# Patient Record
Sex: FEMALE | Race: WHITE | Employment: FULL TIME | URBAN - METROPOLITAN AREA
[De-identification: names, ages, dates, MRNs, and addresses within clinical notes are randomized per-mention and may not be internally consistent; named-entity substitution may affect disease eponyms.]

---

## 2017-06-02 ENCOUNTER — OFFICE VISIT (OUTPATIENT)
Dept: ENDOCRINOLOGY | Facility: CLINIC | Age: 57
End: 2017-06-02

## 2017-06-02 VITALS
DIASTOLIC BLOOD PRESSURE: 55 MMHG | BODY MASS INDEX: 31.47 KG/M2 | WEIGHT: 171 LBS | HEART RATE: 75 BPM | HEIGHT: 62 IN | TEMPERATURE: 98.9 F | OXYGEN SATURATION: 97 % | SYSTOLIC BLOOD PRESSURE: 119 MMHG

## 2017-06-02 DIAGNOSIS — E66.01 MORBID OBESITY DUE TO EXCESS CALORIES (H): Primary | ICD-10-CM

## 2017-06-02 RX ORDER — TOPIRAMATE 25 MG/1
TABLET, FILM COATED ORAL
Qty: 90 TABLET | Refills: 5 | Status: SHIPPED | OUTPATIENT
Start: 2017-06-02 | End: 2017-09-15

## 2017-06-02 ASSESSMENT — ENCOUNTER SYMPTOMS
RECTAL PAIN: 0
MYALGIAS: 1
ARTHRALGIAS: 0
DIARRHEA: 0
NERVOUS/ANXIOUS: 1
EYE REDNESS: 0
VOMITING: 0
LOSS OF CONSCIOUSNESS: 0
EYE WATERING: 0
BOWEL INCONTINENCE: 0
NAUSEA: 1
NECK PAIN: 1
ABDOMINAL PAIN: 1
NUMBNESS: 1
DIZZINESS: 0
HEARTBURN: 0
ALTERED TEMPERATURE REGULATION: 0
PANIC: 0
POOR WOUND HEALING: 0
FEVER: 0
NAIL CHANGES: 0
DOUBLE VISION: 0
POLYDIPSIA: 0
MUSCLE CRAMPS: 0
POLYPHAGIA: 1
DECREASED CONCENTRATION: 0
RECTAL BLEEDING: 0
FATIGUE: 1
WEAKNESS: 1
EYE IRRITATION: 1
MUSCLE WEAKNESS: 1
HALLUCINATIONS: 0
BACK PAIN: 0
WEIGHT GAIN: 1
WEIGHT LOSS: 0
BLOOD IN STOOL: 0
BLOATING: 1
DISTURBANCES IN COORDINATION: 0
SPEECH CHANGE: 0
MEMORY LOSS: 0
JOINT SWELLING: 0
INSOMNIA: 1
JAUNDICE: 0
PARALYSIS: 0
DEPRESSION: 1
CHILLS: 0
HEADACHES: 1
SEIZURES: 0
TREMORS: 1
TINGLING: 1
DECREASED APPETITE: 0
INCREASED ENERGY: 0
CONSTIPATION: 1
EYE PAIN: 1
NIGHT SWEATS: 0
SKIN CHANGES: 1
STIFFNESS: 0

## 2017-06-02 ASSESSMENT — PAIN SCALES - GENERAL: PAINLEVEL: NO PAIN (0)

## 2017-06-02 NOTE — PROGRESS NOTES
"    Return Medical Weight Management Note     Linda Mcdaniel  MRN:  [id number]  :  1960  JL:  2017    Dear Colleagues    I had the pleasure of seeing your patient Linda Mcdaniel.  She is a 56 year old female who I am continuing to see for treatment of obesity related to: Sleep Apnea (has CPAP and does not use), Knee Pain and Depression. s/p LRYGB with Dr Green in  with pre-weight 238 lbs and gibson 169 lbs.     CURRENT WEIGHT:   171 lbs 0 oz    Wt Readings from Last 4 Encounters:   17 77.6 kg (171 lb)   16 73.9 kg (163 lb)   16 79.2 kg (174 lb 8 oz)   02/13/15 82 kg (180 lb 11.2 oz)     Height:  5' 2.01\"  Body Mass Index:  Body mass index is 31.27 kg/(m^2).  Vitals:  B/P: 128/71, P: 70    Initial consult weight was 190 on 13.  Weight change since last seen on 2016 is down 3 pounds.   Total loss is 30 pounds.    INTERVAL HISTORY:  Feeling stress, craving and grazing on carbs. Feels that naltrexone no longer helps her resist sweets, which she identifies as major problem. Also taking bupropion through day as additional contributor on this effort.     Diet and Activity Changes Since Last Visit Reviewed With Patient 2017   I have made the following changes to my diet since my last visit: more protein focused decrease in carbohydrates   With regards to my diet, I am still struggling with: grazing throughout the day and craving carbs   For breakfast, I typically eat: greek yogurt and fruit   For lunch, I typically eat: nura salad and cottage cheese or meat   For supper, I typically eat: a meat or cheese   For snack(s), I typically eat: protein bar or protein drink sometimes milk   I have made the following changes to my activity/exercise since my last visit: started daily walks had to drop gym membership   With regards to my activity/exercise, I am still struggling with: motivation to stay active and no longer have a  to weight lift with "     MEDICATIONS:   Current Outpatient Prescriptions   Medication     naltrexone (DEPADE;REVIA) 50 MG tablet     MELATONIN PO     PILOCARPINE HCL PO     BIOTIN PO     CYANOCOBALAMIN SL     buPROPion (WELLBUTRIN) 75 MG tablet     amitriptyline (ELAVIL) 50 MG tablet     escitalopram (LEXAPRO) 20 MG tablet     levonorgestrel (MIRENA) 20 MCG/24HR IUD     Multiple Vitamin (MULTIVITAMINS PO)     Cholecalciferol (VITAMIN D-3) 5000 UNITS TABS     No current facility-administered medications for this visit.        Weight Loss Medication History Reviewed With Patient 6/2/2017   Which weight loss medications are you currently taking on a regular basis?  Naltrexone   Are you having any side effects from the weight loss medication that we have prescribed you? Yes   If you are having side effects please describe: feel like it is no longer effective to help reduce cravings     ASSESSMENT:   Will stop naltrexone and trial topiramate 25=>75mg HS. BP is good.    FOLLOW-UP:    12 weeks.  10/15 minutes spent on counseling and education     Sincerely,    Dipesh Zaman MD

## 2017-06-02 NOTE — MR AVS SNAPSHOT
After Visit Summary   6/2/2017    Linda Mcdaniel    MRN: [id number]           Patient Information     Date Of Birth          1960        Visit Information        Provider Department      6/2/2017 9:00 AM Dipesh Zaman MD [facility] Medical Weight Management        Today's Diagnoses     Morbid obesity due to excess calories (H)    -  1       Follow-ups after your visit        Follow-up notes from your care team     Return in about 3 months (around 9/2/2017).      Who to contact     Please call your clinic at [phone] to:    Ask questions about your health    Make or cancel appointments    Discuss your medicines    Learn about your test results    Speak to your doctor   If you have compliments or concerns about an experience at your clinic, or if you wish to file a complaint, please contact [facility] Patient Relations at [phone] or email us at [email or web address]         Additional Information About Your Visit        MyChart Information     [employer] gives you secure access to your electronic health record. If you see a primary care provider, you can also send messages to your care team and make appointments. If you have questions, please call your primary care clinic.  If you do not have a primary care provider, please call [phone] and they will assist you.      [facility] is an electronic gateway that provides easy, online access to your medical records. With [facility], you can request a clinic appointment, read your test results, renew a prescription or communicate with your care team.     To access your existing account, please contact your [facility] or call [phone] for assistance.        Care EveryWhere ID     This is your Care EveryWhere ID. This could be used by other organizations to access your [place] medical records  [id number]        Your Vitals Were     Pulse Temperature Height Pulse  "Oximetry BMI (Body Mass Index)       75 98.9  F (37.2  C) 1.575 m (5' 2.01\") 97% 31.27 kg/m2        Blood Pressure from Last 3 Encounters:   06/02/17 119/55   12/02/16 110/74   03/11/16 128/71    Weight from Last 3 Encounters:   06/02/17 77.6 kg (171 lb)   12/02/16 73.9 kg (163 lb)   03/11/16 79.2 kg (174 lb 8 oz)              Today, you had the following     No orders found for display         Today's Medication Changes          These changes are accurate as of: 6/2/17  9:24 AM.  If you have any questions, ask your nurse or doctor.               Start taking these medicines.        Dose/Directions    topiramate 25 MG tablet   Commonly known as:  TOPAMAX   Used for:  Morbid obesity due to excess calories (H)   Started by:  Dipesh Zaman MD        25 mg at bedtime for 1 week, 50 mg at bedtime for 1 week and 75 mg daily at bedtime thereafter   Quantity:  90 tablet   Refills:  5         Stop taking these medicines if you haven't already. Please contact your care team if you have questions.     CITRACAL CALCIUM+D PO   Stopped by:  Dipesh Zaman MD           naltrexone 50 MG tablet   Commonly known as:  DEPADE;REVIA   Stopped by:  Dipesh Zaman MD                Where to get your medicines      These medications were sent to [id number] [[place]][address] [address]     Phone:  [phone]     topiramate 25 MG tablet                Primary Care Provider Office Phone # Fax #    [facility] [phone]       [address] [place] [address]        Thank you!     Thank you for choosing [facility]NAG[facility]  for your care. Our goal is always to provide you with excellent care. Hearing back from our patients is one way we can continue to improve our services. Please take a few minutes to complete the written survey that you may receive in the mail after your " visit with us. Thank you!             Your Updated Medication List - Protect others around you: Learn how to safely use, store and throw away your medicines at www.disposemymeds.org.          This list is accurate as of: 6/2/17  9:24 AM.  Always use your most recent med list.                   Brand Name Dispense Instructions for use    amitriptyline 50 MG tablet    ELAVIL     Take 50 mg by mouth At Bedtime       BIOTIN PO      Take 10,000 mcg by mouth       buPROPion 75 MG tablet    WELLBUTRIN     Take 75 mg by mouth 5 times daily       CYANOCOBALAMIN SL          LEXAPRO 20 MG tablet   Generic drug:  escitalopram      Take 40 mg by mouth daily       MELATONIN PO          MIRENA (52 MG) 20 MCG/24HR IUD   Generic drug:  levonorgestrel      1 each by Intrauterine route once       MULTIVITAMINS PO      Take by mouth 2 times daily       PILOCARPINE HCL PO      Take 5 mg by mouth       topiramate 25 MG tablet    TOPAMAX    90 tablet    25 mg at bedtime for 1 week, 50 mg at bedtime for 1 week and 75 mg daily at bedtime thereafter       Vitamin D-3 5000 UNITS Tabs      Take  by mouth daily.

## 2017-06-02 NOTE — NURSING NOTE
"Chief Complaint   Patient presents with     Weight Problem     RMWM       Vitals:    06/02/17 0843   BP: 119/55   BP Location: Left arm   Patient Position: Chair   Cuff Size: Adult Large   Pulse: 75   Temp: 98.9  F (37.2  C)   SpO2: 97%   Weight: 171 lb   Height: 5' 2.01\"       Body mass index is 31.27 kg/(m^2).      Tiffani Cuevas                          "

## 2017-06-02 NOTE — LETTER
"2017       RE: Linda Mcdaniel  [address] [facility] [address] [facility] [address]     Dear Colleague,    Thank you for referring your patient, Linda Mcdaniel, to the [facility] MEDICAL WE[facility] MANAGEMENT at [facility]. Please see a copy of my visit note below.        Return Medical Weight Management Note     Linda Mcdaniel  MRN:  [id number]  :  1960  JL:  2017    Dear Colleagues    I had the pleasure of seeing your patient Linda Mcdaniel.  She is a 56 year old female who I am continuing to see for treatment of obesity related to: Sleep Apnea (has CPAP and does not use), Knee Pain and Depression. s/p LRYGB with Dr Green in  with pre-weight 238 lbs and gibson 169 lbs.     CURRENT WEIGHT:   171 lbs 0 oz    Wt Readings from Last 4 Encounters:   17 77.6 kg (171 lb)   16 73.9 kg (163 lb)   16 79.2 kg (174 lb 8 oz)   02/13/15 82 kg (180 lb 11.2 oz)     Height:  5' 2.01\"  Body Mass Index:  Body mass index is 31.27 kg/(m^2).  Vitals:  B/P: 128/71, P: 70    Initial consult weight was 190 on 13.  Weight change since last seen on 2016 is down 3 pounds.   Total loss is 30 pounds.    INTERVAL HISTORY:  Feeling stress, craving and grazing on carbs. Feels that naltrexone no longer helps her resist sweets, which she identifies as major problem. Also taking bupropion through day as additional contributor on this effort.     Diet and Activity Changes Since Last Visit Reviewed With Patient 2017   I have made the following changes to my diet since my last visit: more protein focused decrease in carbohydrates   With regards to my diet, I am still struggling with: grazing throughout the day and craving carbs   For breakfast, I typically eat: greek yogurt and fruit   For lunch, I typically eat: nura salad and cottage cheese or meat   For supper, I typically eat: a meat or cheese   For snack(s), I typically eat: protein bar or protein drink sometimes " milk   I have made the following changes to my activity/exercise since my last visit: started daily walks had to drop gym membership   With regards to my activity/exercise, I am still struggling with: motivation to stay active and no longer have a  to weight lift with     MEDICATIONS:   Current Outpatient Prescriptions   Medication     naltrexone (DEPADE;REVIA) 50 MG tablet     MELATONIN PO     PILOCARPINE HCL PO     BIOTIN PO     CYANOCOBALAMIN SL     buPROPion (WELLBUTRIN) 75 MG tablet     amitriptyline (ELAVIL) 50 MG tablet     escitalopram (LEXAPRO) 20 MG tablet     levonorgestrel (MIRENA) 20 MCG/24HR IUD     Multiple Vitamin (MULTIVITAMINS PO)     Cholecalciferol (VITAMIN D-3) 5000 UNITS TABS     No current facility-administered medications for this visit.        Weight Loss Medication History Reviewed With Patient 6/2/2017   Which weight loss medications are you currently taking on a regular basis?  Naltrexone   Are you having any side effects from the weight loss medication that we have prescribed you? Yes   If you are having side effects please describe: feel like it is no longer effective to help reduce cravings     ASSESSMENT:   Will stop naltrexone and trial topiramate 25=>75mg HS. BP is good.    FOLLOW-UP:    12 weeks.  10/15 minutes spent on counseling and education     Sincerely,    Dipesh Zaman MD

## 2017-06-10 ENCOUNTER — HEALTH MAINTENANCE LETTER (OUTPATIENT)
Age: 57
End: 2017-06-10

## 2017-09-12 ASSESSMENT — ENCOUNTER SYMPTOMS
DISTURBANCES IN COORDINATION: 0
EYE IRRITATION: 1
DEPRESSION: 1
TASTE DISTURBANCE: 0
BLOATING: 1
INCREASED ENERGY: 1
FATIGUE: 1
NECK MASS: 0
SMELL DISTURBANCE: 0
ABDOMINAL PAIN: 1
NAUSEA: 0
DIZZINESS: 1
EYE REDNESS: 0
TINGLING: 1
NIGHT SWEATS: 0
LOSS OF CONSCIOUSNESS: 0
DECREASED CONCENTRATION: 1
DECREASED APPETITE: 1
PARALYSIS: 0
SORE THROAT: 0
JAUNDICE: 0
RECTAL BLEEDING: 0
SEIZURES: 0
CONSTIPATION: 0
DOUBLE VISION: 0
WEIGHT LOSS: 0
HEADACHES: 1
NUMBNESS: 0
WEAKNESS: 0
CHILLS: 0
POLYDIPSIA: 1
BLOOD IN STOOL: 0
DYSURIA: 0
EYE PAIN: 0
PANIC: 0
HEMATURIA: 0
FLANK PAIN: 0
INSOMNIA: 1
VOMITING: 0
ALTERED TEMPERATURE REGULATION: 0
HEARTBURN: 1
MEMORY LOSS: 0
WEIGHT GAIN: 1
BOWEL INCONTINENCE: 0
EYE WATERING: 0
TROUBLE SWALLOWING: 0
SPEECH CHANGE: 0
HOARSE VOICE: 1
POLYPHAGIA: 1
DIARRHEA: 0
FEVER: 0
SINUS CONGESTION: 1
RECTAL PAIN: 0
SINUS PAIN: 1
NERVOUS/ANXIOUS: 1
TREMORS: 0
DIFFICULTY URINATING: 0
HALLUCINATIONS: 0

## 2017-09-15 ENCOUNTER — OFFICE VISIT (OUTPATIENT)
Dept: ENDOCRINOLOGY | Facility: CLINIC | Age: 57
End: 2017-09-15

## 2017-09-15 VITALS
HEIGHT: 62 IN | OXYGEN SATURATION: 97 % | BODY MASS INDEX: 31.65 KG/M2 | DIASTOLIC BLOOD PRESSURE: 63 MMHG | HEART RATE: 89 BPM | WEIGHT: 172 LBS | SYSTOLIC BLOOD PRESSURE: 112 MMHG

## 2017-09-15 DIAGNOSIS — E66.01 MORBID OBESITY DUE TO EXCESS CALORIES (H): ICD-10-CM

## 2017-09-15 RX ORDER — TOPIRAMATE 25 MG/1
50 TABLET, FILM COATED ORAL 2 TIMES DAILY
Qty: 120 TABLET | Refills: 5 | Status: SHIPPED | OUTPATIENT
Start: 2017-09-15 | End: 2017-12-18

## 2017-09-15 ASSESSMENT — PAIN SCALES - GENERAL: PAINLEVEL: NO PAIN (0)

## 2017-09-15 NOTE — PATIENT INSTRUCTIONS
Increase Topiramate 50 mg twice daily.    Follow up with Dr. Zaman 3 months    Cristin Hoover, RN care coordinator [phone]

## 2017-09-15 NOTE — PROGRESS NOTES
"    Return Medical Weight Management Note     Linda Mcdaniel  MRN:  [id number]  :  1960  JL:  2017    Dear Colleagues    I had the pleasure of seeing your patient Linda Mcdaniel.  She is a 56 year old female who I am continuing to see for treatment of obesity related to: Sleep Apnea (has CPAP and does not use), Knee Pain and Depression. s/p LRYGB with Dr Green in  with pre-weight 238 lbs and gibson 169 lbs.     CURRENT WEIGHT:   172 lbs 0 oz    Wt Readings from Last 4 Encounters:   09/15/17 78 kg (172 lb)   17 77.6 kg (171 lb)   16 73.9 kg (163 lb)   16 79.2 kg (174 lb 8 oz)     Height:  5' 2\"  Body Mass Index:  Body mass index is 31.46 kg/(m^2).  Vitals:  B/P: 128/71, P: 70    Initial consult weight was 190 on 13.  Weight change since last seen on 2017 is down 3 pounds.   Total loss is 30 pounds.    INTERVAL HISTORY:  Feeling stress, craving and grazing on carbs. Not sure that topiramate is helping but feels it did help early on. Also taking bupropion through day as additional contributor on this effort.     Diet and Activity Changes Since Last Visit Reviewed With Patient 2017   I have made the following changes to my diet since my last visit: Increase protein intake and decrease carbohydrates   With regards to my diet, I am still struggling with: Carbohydrates cravings, sugar cravings   For breakfast, I typically eat: Yogurt or [place] light instant breakfast   For lunch, I typically eat: Salad and cottage cheese   For supper, I typically eat: Steak, chicken, or tuna   For snack(s), I typically eat: Cheese, protein bars   I have made the following changes to my activity/exercise since my last visit: Increase walking, taking stairs instead of elevators   With regards to my activity/exercise, I am still struggling with: Daily exercise - tend to skip it on my days off     MEDICATIONS:   Current Outpatient Prescriptions   Medication     topiramate (TOPAMAX) 25 " MG tablet     MELATONIN PO     PILOCARPINE HCL PO     BIOTIN PO     CYANOCOBALAMIN SL     buPROPion (WELLBUTRIN) 75 MG tablet     amitriptyline (ELAVIL) 50 MG tablet     escitalopram (LEXAPRO) 20 MG tablet     levonorgestrel (MIRENA) 20 MCG/24HR IUD     Multiple Vitamin (MULTIVITAMINS PO)     Cholecalciferol (VITAMIN D-3) 5000 UNITS TABS     [DISCONTINUED] topiramate (TOPAMAX) 25 MG tablet     No current facility-administered medications for this visit.        Weight Loss Medication History Reviewed With Patient 9/12/2017   Which weight loss medications are you currently taking on a regular basis?  Topamax (topiramate)   Are you having any side effects from the weight loss medication that we have prescribed you? Yes   If you are having side effects please describe: Headache, dizziness or loss of balance, sinus congestion and concentrated urine     ASSESSMENT:   Will increase topiramate to 50 BID. BP is good. Reinforce food plan - focus on no between meal snacking, aggressive lowering of starches and cheese    FOLLOW-UP:    12 weeks.  10/15 minutes spent on counseling and education     Sincerely,    Dipesh Zaman MD

## 2017-09-15 NOTE — NURSING NOTE
"(   Chief Complaint   Patient presents with     Weight Loss     f/u    )    ( Weight: 172 lb )  ( Height: 5' 2\" )  ( BMI (Calculated): 31.52 )  (   )  (   )  (   )  (   )  (   )  (   )    ( BP: 112/63 )  (   )  (   )  (   )  ( Pulse: 89 )  (   )  ( SpO2: 97 % )    (   Patient Active Problem List   Diagnosis     Morbid obesity (H)     S/P bariatric surgery    )  (   Current Outpatient Prescriptions   Medication Sig Dispense Refill     topiramate (TOPAMAX) 25 MG tablet 25 mg at bedtime for 1 week, 50 mg at bedtime for 1 week and 75 mg daily at bedtime thereafter 90 tablet 5     MELATONIN PO        PILOCARPINE HCL PO Take 5 mg by mouth       BIOTIN PO Take 10,000 mcg by mouth       CYANOCOBALAMIN SL        buPROPion (WELLBUTRIN) 75 MG tablet Take 75 mg by mouth 5 times daily        amitriptyline (ELAVIL) 50 MG tablet Take 50 mg by mouth At Bedtime       escitalopram (LEXAPRO) 20 MG tablet Take 40 mg by mouth daily        levonorgestrel (MIRENA) 20 MCG/24HR IUD 1 each by Intrauterine route once       Multiple Vitamin (MULTIVITAMINS PO) Take by mouth 2 times daily       Cholecalciferol (VITAMIN D-3) 5000 UNITS TABS Take  by mouth daily.      )  ( Diabetes Eval:    )    ( Pain Eval:  No Pain (0) )    ( Wound Eval:       )    (   History   Smoking Status     Former Smoker     Packs/day: 1.00     Years: 15.00     Types: Cigarettes     Start date: 6/1/1974     Quit date: 9/17/1985   Smokeless Tobacco     Not on file    )    ( Signed By:  Costa Cuadra; September 15, 2017; 10:17 AM )    "

## 2017-09-15 NOTE — PROGRESS NOTES
"    Return Medical Weight Management Note     Linda Mcdaniel  MRN:  [id number]  :  1960  JL:  2017    Dear Colleagues    I had the pleasure of seeing your patient Linda Mcdaniel.  She is a 56 year old female who I am continuing to see for treatment of obesity related to: Sleep Apnea (has CPAP and does not use), Knee Pain and Depression. s/p LRYGB with Dr Green in  with pre-weight 238 lbs and gibson 169 lbs.     CURRENT WEIGHT:   172 lbs 0 oz    Wt Readings from Last 4 Encounters:   09/15/17 78 kg (172 lb)   17 77.6 kg (171 lb)   16 73.9 kg (163 lb)   16 79.2 kg (174 lb 8 oz)     Height:  5' 2\"  Body Mass Index:  Body mass index is 31.46 kg/(m^2).  Vitals:  B/P: 128/71, P: 70    Initial consult weight was 190 on 13.  Weight change since last seen on 2016 is up 1 pounds.   Total loss is 21 pounds.    INTERVAL HISTORY:  Feeling stress, craving and grazing on carbs. Feels that naltrexone no longer helps her resist sweets, which she identifies as major problem. Also taking bupropion through day as additional contributor on this effort.     Diet and Activity Changes Since Last Visit Reviewed With Patient 2017   I have made the following changes to my diet since my last visit: Increase protein intake and decrease carbohydrates   With regards to my diet, I am still struggling with: Carbohydrates cravings, sugar cravings   For breakfast, I typically eat: Yogurt or [place] light instant breakfast   For lunch, I typically eat: Salad and cottage cheese   For supper, I typically eat: Steak, chicken, or tuna   For snack(s), I typically eat: Cheese, protein bars   I have made the following changes to my activity/exercise since my last visit: Increase walking, taking stairs instead of elevators   With regards to my activity/exercise, I am still struggling with: Daily exercise - tend to skip it on my days off     MEDICATIONS:   Current Outpatient Prescriptions   Medication "     topiramate (TOPAMAX) 25 MG tablet     MELATONIN PO     PILOCARPINE HCL PO     BIOTIN PO     CYANOCOBALAMIN SL     buPROPion (WELLBUTRIN) 75 MG tablet     amitriptyline (ELAVIL) 50 MG tablet     escitalopram (LEXAPRO) 20 MG tablet     levonorgestrel (MIRENA) 20 MCG/24HR IUD     Multiple Vitamin (MULTIVITAMINS PO)     Cholecalciferol (VITAMIN D-3) 5000 UNITS TABS     No current facility-administered medications for this visit.        Weight Loss Medication History Reviewed With Patient 9/12/2017   Which weight loss medications are you currently taking on a regular basis?  Topamax (topiramate)   Are you having any side effects from the weight loss medication that we have prescribed you? Yes   If you are having side effects please describe: Headache, dizziness or loss of balance, sinus congestion and concentrated urine     ASSESSMENT:   Will stop naltrexone and trial topiramate 25=>75mg HS. BP is good.    FOLLOW-UP:    12 weeks.  10/15 minutes spent on counseling and education     Sincerely,    Dipesh Zaman MD

## 2017-09-15 NOTE — LETTER
"9/15/2017       RE: Linda Mcdaniel  [address] [facility] [address]     Dear Colleague,    Thank you for referring your patient, Linda Mcdaniel, to the [facility] MEDICAL WEIGHT MANAGEMENT at [facility]. Please see a copy of my visit note below.        Return Medical Weight Management Note     Linda Mcdaniel  MRN:  [id number]  :  1960  JL:  2017    Dear Colleagues    I had the pleasure of seeing your patient Linda Mcdaniel.  She is a 56 year old female who I am continuing to see for treatment of obesity related to: Sleep Apnea (has CPAP and does not use), Knee Pain and Depression. s/p LRYGB with Dr Green in  with pre-weight 238 lbs and gibson 169 lbs.     CURRENT WEIGHT:   172 lbs 0 oz    Wt Readings from Last 4 Encounters:   09/15/17 78 kg (172 lb)   17 77.6 kg (171 lb)   16 73.9 kg (163 lb)   16 79.2 kg (174 lb 8 oz)     Height:  5' 2\"  Body Mass Index:  Body mass index is 31.46 kg/(m^2).  Vitals:  B/P: 128/71, P: 70    Initial consult weight was 190 on 13.  Weight change since last seen on 2017 is down 3 pounds.   Total loss is 30 pounds.    INTERVAL HISTORY:  Feeling stress, craving and grazing on carbs. Not sure that topiramate is helping but feels it did help early on. Also taking bupropion through day as additional contributor on this effort.     Diet and Activity Changes Since Last Visit Reviewed With Patient 2017   I have made the following changes to my diet since my last visit: Increase protein intake and decrease carbohydrates   With regards to my diet, I am still struggling with: Carbohydrates cravings, sugar cravings   For breakfast, I typically eat: Yogurt or [place] light instant breakfast   For lunch, I typically eat: Salad and cottage cheese   For supper, I typically eat: Steak, chicken, or tuna   For snack(s), I typically eat: Cheese, protein bars   I have made the following changes to my " activity/exercise since my last visit: Increase walking, taking stairs instead of elevators   With regards to my activity/exercise, I am still struggling with: Daily exercise - tend to skip it on my days off     MEDICATIONS:   Current Outpatient Prescriptions   Medication     topiramate (TOPAMAX) 25 MG tablet     MELATONIN PO     PILOCARPINE HCL PO     BIOTIN PO     CYANOCOBALAMIN SL     buPROPion (WELLBUTRIN) 75 MG tablet     amitriptyline (ELAVIL) 50 MG tablet     escitalopram (LEXAPRO) 20 MG tablet     levonorgestrel (MIRENA) 20 MCG/24HR IUD     Multiple Vitamin (MULTIVITAMINS PO)     Cholecalciferol (VITAMIN D-3) 5000 UNITS TABS     [DISCONTINUED] topiramate (TOPAMAX) 25 MG tablet     No current facility-administered medications for this visit.        Weight Loss Medication History Reviewed With Patient 2017   Which weight loss medications are you currently taking on a regular basis?  Topamax (topiramate)   Are you having any side effects from the weight loss medication that we have prescribed you? Yes   If you are having side effects please describe: Headache, dizziness or loss of balance, sinus congestion and concentrated urine     ASSESSMENT:   Will increase topiramate to 50 BID. BP is good. Reinforce food plan - focus on no between meal snacking, aggressive lowering of starches and cheese    FOLLOW-UP:    12 weeks.  10/15 minutes spent on counseling and education     Sincerely,    Dipesh Zaman MD        Return Medical Weight Management Note     Linda Mcdaniel  MRN:  [id number]  :  1960  JL:  2017    Dear Colleagues    I had the pleasure of seeing your patient Linda Mcdaniel.  She is a 56 year old female who I am continuing to see for treatment of obesity related to: Sleep Apnea (has CPAP and does not use), Knee Pain and Depression. s/p LRYGB with Dr Green in  with pre-weight 238 lbs and gibson 169 lbs.     CURRENT WEIGHT:   172 lbs 0 oz    Wt Readings from Last 4  "Encounters:   09/15/17 78 kg (172 lb)   06/02/17 77.6 kg (171 lb)   12/02/16 73.9 kg (163 lb)   03/11/16 79.2 kg (174 lb 8 oz)     Height:  5' 2\"  Body Mass Index:  Body mass index is 31.46 kg/(m^2).  Vitals:  B/P: 128/71, P: 70    Initial consult weight was 190 on 12/13/13.  Weight change since last seen on 12/2/2016 is up 1 pounds.   Total loss is 21 pounds.    INTERVAL HISTORY:  Feeling stress, craving and grazing on carbs. Feels that naltrexone no longer helps her resist sweets, which she identifies as major problem. Also taking bupropion through day as additional contributor on this effort.     Diet and Activity Changes Since Last Visit Reviewed With Patient 9/12/2017   I have made the following changes to my diet since my last visit: Increase protein intake and decrease carbohydrates   With regards to my diet, I am still struggling with: Carbohydrates cravings, sugar cravings   For breakfast, I typically eat: Yogurt or [place] light instant breakfast   For lunch, I typically eat: Salad and cottage cheese   For supper, I typically eat: Steak, chicken, or tuna   For snack(s), I typically eat: Cheese, protein bars   I have made the following changes to my activity/exercise since my last visit: Increase walking, taking stairs instead of elevators   With regards to my activity/exercise, I am still struggling with: Daily exercise - tend to skip it on my days off     MEDICATIONS:   Current Outpatient Prescriptions   Medication     topiramate (TOPAMAX) 25 MG tablet     MELATONIN PO     PILOCARPINE HCL PO     BIOTIN PO     CYANOCOBALAMIN SL     buPROPion (WELLBUTRIN) 75 MG tablet     amitriptyline (ELAVIL) 50 MG tablet     escitalopram (LEXAPRO) 20 MG tablet     levonorgestrel (MIRENA) 20 MCG/24HR IUD     Multiple Vitamin (MULTIVITAMINS PO)     Cholecalciferol (VITAMIN D-3) 5000 UNITS TABS     No current facility-administered medications for this visit.        Weight Loss Medication History Reviewed With Patient " 9/12/2017   Which weight loss medications are you currently taking on a regular basis?  Topamax (topiramate)   Are you having any side effects from the weight loss medication that we have prescribed you? Yes   If you are having side effects please describe: Headache, dizziness or loss of balance, sinus congestion and concentrated urine     ASSESSMENT:   Will stop naltrexone and trial topiramate 25=>75mg HS. BP is good.    FOLLOW-UP:    12 weeks.  10/15 minutes spent on counseling and education     Sincerely,    Dipesh Zaman MD

## 2017-12-17 ASSESSMENT — ENCOUNTER SYMPTOMS
SMELL DISTURBANCE: 0
SINUS PAIN: 0
HEADACHES: 1
CHILLS: 0
SORE THROAT: 0
NECK MASS: 0
DEPRESSION: 1
LOSS OF CONSCIOUSNESS: 0
POLYDIPSIA: 0
WEAKNESS: 0
DECREASED CONCENTRATION: 0
SPEECH CHANGE: 0
EYE IRRITATION: 0
EYE REDNESS: 0
NERVOUS/ANXIOUS: 1
EYE WATERING: 0
TROUBLE SWALLOWING: 0
DIZZINESS: 1
WEIGHT LOSS: 0
FEVER: 0
ALTERED TEMPERATURE REGULATION: 0
DISTURBANCES IN COORDINATION: 0
INSOMNIA: 1
MEMORY LOSS: 0
TINGLING: 0
HALLUCINATIONS: 0
SINUS CONGESTION: 0
TREMORS: 0
DECREASED APPETITE: 0
FATIGUE: 1
PARALYSIS: 0
TASTE DISTURBANCE: 0
INCREASED ENERGY: 1
PANIC: 0
DOUBLE VISION: 0
HOARSE VOICE: 0
NIGHT SWEATS: 0
SEIZURES: 0
NUMBNESS: 0
EYE PAIN: 1
POLYPHAGIA: 1
WEIGHT GAIN: 1

## 2017-12-18 ENCOUNTER — OFFICE VISIT (OUTPATIENT)
Dept: ENDOCRINOLOGY | Facility: CLINIC | Age: 57
End: 2017-12-18
Payer: COMMERCIAL

## 2017-12-18 VITALS
BODY MASS INDEX: 32.35 KG/M2 | DIASTOLIC BLOOD PRESSURE: 61 MMHG | OXYGEN SATURATION: 97 % | HEART RATE: 80 BPM | SYSTOLIC BLOOD PRESSURE: 111 MMHG | WEIGHT: 175.8 LBS | HEIGHT: 62 IN

## 2017-12-18 DIAGNOSIS — E66.01 MORBID OBESITY DUE TO EXCESS CALORIES (H): ICD-10-CM

## 2017-12-18 RX ORDER — FLUOXETINE 10 MG/1
TABLET, FILM COATED ORAL
COMMUNITY

## 2017-12-18 RX ORDER — TOPIRAMATE 25 MG/1
75 TABLET, FILM COATED ORAL 2 TIMES DAILY
Qty: 180 TABLET | Refills: 5 | Status: SHIPPED | OUTPATIENT
Start: 2017-12-18

## 2017-12-18 NOTE — PROGRESS NOTES
"    Return Medical Weight Management Note     Linda Mcdaniel  MRN:  [id number]  :  1960  JL:  2017    Dear Colleagues    I had the pleasure of seeing your patient Linda Mcdaniel.  She is a 56 year old female who I am continuing to see for treatment of obesity related to: Sleep Apnea (has CPAP and does not use), Knee Pain and Depression. s/p LRYGB with Dr Green in  with pre-weight 238 lbs and gibson 169 lbs.     CURRENT WEIGHT:   175 lbs 12.8 oz    Wt Readings from Last 4 Encounters:   17 79.7 kg (175 lb 12.8 oz)   09/15/17 78 kg (172 lb)   17 77.6 kg (171 lb)   16 73.9 kg (163 lb)     Height:  5' 2\"  Body Mass Index:  Body mass index is 32.15 kg/(m^2).  Vitals:  B/P: 128/71, P: 70    Initial consult weight was 190 on 13.  Weight change since last seen on 9/15/2017 is up 3 pounds.   Total loss is 15 pounds.    INTERVAL HISTORY:  Feeling stress, craving and grazing on carbs. Not sure that topiramate is helping but feels it did help early on. Also taking bupropion through day as additional contributor on this effort.     Diet and Activity Changes Since Last Visit Reviewed With Patient 2017   I have made the following changes to my diet since my last visit: Closely monitoring protein/carbohydrate intake - drinking more water   With regards to my diet, I am still struggling with: Cravings for sweets/carbohydrates.  Grazing or eating between meals - always hungry   For breakfast, I typically eat: Greek yogurt or protein drink   For lunch, I typically eat: Salad   For supper, I typically eat: Lean Cuisine or Healthy Choice meal   For snack(s), I typically eat: Coffee (latte), cheese, protein bar   I have made the following changes to my activity/exercise since my last visit: None   With regards to my activity/exercise, I am still struggling with: Getting enough physical activity on my days off from work     MEDICATIONS:   Current Outpatient Prescriptions   Medication "     FLUoxetine (PROZAC) 10 MG tablet     topiramate (TOPAMAX) 25 MG tablet     MELATONIN PO     PILOCARPINE HCL PO     BIOTIN PO     CYANOCOBALAMIN SL     buPROPion (WELLBUTRIN) 75 MG tablet     amitriptyline (ELAVIL) 50 MG tablet     levonorgestrel (MIRENA) 20 MCG/24HR IUD     Multiple Vitamin (MULTIVITAMINS PO)     Cholecalciferol (VITAMIN D-3) 5000 UNITS TABS     No current facility-administered medications for this visit.        Weight Loss Medication History Reviewed With Patient 12/17/2017   Which weight loss medications are you currently taking on a regular basis?  Topamax (topiramate)   Are you having any side effects from the weight loss medication that we have prescribed you? Yes   If you are having side effects please describe: It just doesn t work well.  Hunger cravings have increased.  I continue to gain weight.  My topiramate level was 2.4 when check in November.     ASSESSMENT:   Will increase topiramate to 75 BID. BP is good. Reinforce food plan - focus on no between meal snacking, aggressive lowering of starches and cheese    FOLLOW-UP:    12 weeks.  10/15 minutes spent on counseling and education     Sincerely,    Dipesh Zaman MD

## 2017-12-18 NOTE — LETTER
"2017       RE: Linda Mcdaniel  [address] [facility] [address]     Dear Colleague,    Thank you for referring your patient, Linda Mcdaniel, to the [facility] MEDICAL WEIGHT MANAGEMENT at [facility]. Please see a copy of my visit note below.        Return Medical Weight Management Note     Linda Mcdaniel  MRN:  [id number]  :  1960  JL:  2017    Dear Colleagues    I had the pleasure of seeing your patient Linda Mcdaniel.  She is a 56 year old female who I am continuing to see for treatment of obesity related to: Sleep Apnea (has CPAP and does not use), Knee Pain and Depression. s/p LRYGB with Dr Green in  with pre-weight 238 lbs and gibson 169 lbs.     CURRENT WEIGHT:   175 lbs 12.8 oz    Wt Readings from Last 4 Encounters:   17 79.7 kg (175 lb 12.8 oz)   09/15/17 78 kg (172 lb)   17 77.6 kg (171 lb)   16 73.9 kg (163 lb)     Height:  5' 2\"  Body Mass Index:  Body mass index is 32.15 kg/(m^2).  Vitals:  B/P: 128/71, P: 70    Initial consult weight was 190 on 13.  Weight change since last seen on 9/15/2017 is up 3 pounds.   Total loss is 15 pounds.    INTERVAL HISTORY:  Feeling stress, craving and grazing on carbs. Not sure that topiramate is helping but feels it did help early on. Also taking bupropion through day as additional contributor on this effort.     Diet and Activity Changes Since Last Visit Reviewed With Patient 2017   I have made the following changes to my diet since my last visit: Closely monitoring protein/carbohydrate intake - drinking more water   With regards to my diet, I am still struggling with: Cravings for sweets/carbohydrates.  Grazing or eating between meals - always hungry   For breakfast, I typically eat: Greek yogurt or protein drink   For lunch, I typically eat: Salad   For supper, I typically eat: Lean Cuisine or Healthy Choice meal   For snack(s), I typically eat: Coffee (latte), " cheese, protein bar   I have made the following changes to my activity/exercise since my last visit: None   With regards to my activity/exercise, I am still struggling with: Getting enough physical activity on my days off from work     MEDICATIONS:   Current Outpatient Prescriptions   Medication     FLUoxetine (PROZAC) 10 MG tablet     topiramate (TOPAMAX) 25 MG tablet     MELATONIN PO     PILOCARPINE HCL PO     BIOTIN PO     CYANOCOBALAMIN SL     buPROPion (WELLBUTRIN) 75 MG tablet     amitriptyline (ELAVIL) 50 MG tablet     levonorgestrel (MIRENA) 20 MCG/24HR IUD     Multiple Vitamin (MULTIVITAMINS PO)     Cholecalciferol (VITAMIN D-3) 5000 UNITS TABS     No current facility-administered medications for this visit.        Weight Loss Medication History Reviewed With Patient 12/17/2017   Which weight loss medications are you currently taking on a regular basis?  Topamax (topiramate)   Are you having any side effects from the weight loss medication that we have prescribed you? Yes   If you are having side effects please describe: It just doesn t work well.  Hunger cravings have increased.  I continue to gain weight.  My topiramate level was 2.4 when check in November.     ASSESSMENT:   Will increase topiramate to 75 BID. BP is good. Reinforce food plan - focus on no between meal snacking, aggressive lowering of starches and cheese    FOLLOW-UP:    12 weeks.  10/15 minutes spent on counseling and education     Sincerely,    Dipesh Zaman MD

## 2017-12-18 NOTE — MR AVS SNAPSHOT
After Visit Summary   12/18/2017    Linda Mcdaniel    MRN: [id number]           Patient Information     Date Of Birth          1960        Visit Information        Provider Department      12/18/2017 10:30 AM Dipesh Zaman MD  Health Medical Weight Management        Today's Diagnoses     Morbid obesity due to excess calories (H)           Follow-ups after your visit        Follow-up notes from your care team     Return in about 3 months (around 3/18/2018).      Who to contact     Please call your clinic at [phone] to:    Ask questions about your health    Make or cancel appointments    Discuss your medicines    Learn about your test results    Speak to your doctor   If you have compliments or concerns about an experience at your clinic, or if you wish to file a complaint, please contact [facility] Patient Relations at [phone] or email us at [email or web address]         Additional Information About Your Visit        MyChart Information     [employer] gives you secure access to your electronic health record. If you see a primary care provider, you can also send messages to your care team and make appointments. If you have questions, please call your primary care clinic.  If you do not have a primary care provider, please call [phone] and they will assist you.      [employer] is an electronic gateway that provides easy, online access to your medical records. With [employer], you can request a clinic appointment, read your test results, renew a prescription or communicate with your care team.     To access your existing account, please contact your [facility] or call [phone] for assistance.        Care EveryWhere ID     This is your Care EveryWhere ID. This could be used by other organizations to access your [facility] medical records  [id number]        Your Vitals Were     Pulse Height Pulse Oximetry BMI  "(Body Mass Index)          80 1.575 m (5' 2\") 97% 32.15 kg/m2         Blood Pressure from Last 3 Encounters:   12/18/17 111/61   09/15/17 112/63   06/02/17 119/55    Weight from Last 3 Encounters:   12/18/17 79.7 kg (175 lb 12.8 oz)   09/15/17 78 kg (172 lb)   06/02/17 77.6 kg (171 lb)              Today, you had the following     No orders found for display         Today's Medication Changes          These changes are accurate as of: 12/18/17 11:05 AM.  If you have any questions, ask your nurse or doctor.               These medicines have changed or have updated prescriptions.        Dose/Directions    topiramate 25 MG tablet   Commonly known as:  TOPAMAX   This may have changed:  how much to take   Used for:  Morbid obesity due to excess calories (H)   Changed by:  Dipesh Zaman MD        Dose:  75 mg   Take 3 tablets (75 mg) by mouth 2 times daily   Quantity:  180 tablet   Refills:  5         Stop taking these medicines if you haven't already. Please contact your care team if you have questions.     LEXAPRO 20 MG tablet   Generic drug:  escitalopram   Stopped by:  Dipesh Zaman MD                Where to get your medicines      Call your pharmacy to confirm that your medication is ready for pickup. It may take up to 24 hours for them to receive the prescription. If the prescription is not ready within 3 business days, please contact your clinic or your provider.     We will let you know when these medications are ready. If you don't hear back within 3 business days, please contact us.     topiramate 25 MG tablet                Primary Care Provider Office Phone # Fax #    [facility] [phone]       [facility] [address]        Equal Access to Services     [facility] CHUCK AH: Reyes Abdullahi, molly johnson, radha ascencio. [facility] [phone].    ATENCIÓN: Si habla " español, tiene a rowell disposición servicios gratuitos de asistencia lingüística. Alessandro rajan [phone].    We comply with applicable federal civil rights laws and [place] laws. We do not discriminate on the basis of race, color, national origin, age, disability, sex, sexual orientation, or gender identity.            Thank you!     Thank you for choosing [facility] WEIGHT MANAGEMENT  for your care. Our goal is always to provide you with excellent care. Hearing back from our patients is one way we can continue to improve our services. Please take a few minutes to complete the written survey that you may receive in the mail after your visit with us. Thank you!             Your Updated Medication List - Protect others around you: Learn how to safely use, store and throw away your medicines at www.disposemymeds.org.          This list is accurate as of: 12/18/17 11:05 AM.  Always use your most recent med list.                   Brand Name Dispense Instructions for use Diagnosis    amitriptyline 50 MG tablet    ELAVIL     Take 50 mg by mouth At Bedtime        BIOTIN PO      Take 10,000 mcg by mouth        buPROPion 75 MG tablet    WELLBUTRIN     Take 75 mg by mouth 5 times daily        CYANOCOBALAMIN SL           FLUoxetine 10 MG tablet    PROzac          MELATONIN PO           MIRENA (52 MG) 20 MCG/24HR IUD   Generic drug:  levonorgestrel      1 each by Intrauterine route once        MULTIVITAMINS PO      Take by mouth 2 times daily        PILOCARPINE HCL PO      Take 5 mg by mouth        topiramate 25 MG tablet    TOPAMAX    180 tablet    Take 3 tablets (75 mg) by mouth 2 times daily    Morbid obesity due to excess calories (H)       Vitamin D-3 5000 UNITS Tabs      Take  by mouth daily.

## 2017-12-18 NOTE — NURSING NOTE
"Chief Complaint   Patient presents with     Weight Problem     RMWM       Vitals:    12/18/17 1043   BP: 111/61   Pulse: 80   SpO2: 97%   Weight: 175 lb 12.8 oz   Height: 5' 2\"       Body mass index is 32.15 kg/(m^2).  Mily Parra CMA                      "

## 2019-09-30 ENCOUNTER — HEALTH MAINTENANCE LETTER (OUTPATIENT)
Age: 59
End: 2019-09-30

## 2019-10-29 ENCOUNTER — HEALTH MAINTENANCE LETTER (OUTPATIENT)
Age: 59
End: 2019-10-29

## 2021-01-15 ENCOUNTER — HEALTH MAINTENANCE LETTER (OUTPATIENT)
Age: 61
End: 2021-01-15

## 2021-10-24 ENCOUNTER — HEALTH MAINTENANCE LETTER (OUTPATIENT)
Age: 61
End: 2021-10-24

## 2022-02-13 ENCOUNTER — HEALTH MAINTENANCE LETTER (OUTPATIENT)
Age: 62
End: 2022-02-13

## 2022-10-15 ENCOUNTER — HEALTH MAINTENANCE LETTER (OUTPATIENT)
Age: 62
End: 2022-10-15

## 2023-03-26 ENCOUNTER — HEALTH MAINTENANCE LETTER (OUTPATIENT)
Age: 63
End: 2023-03-26

## 2023-10-29 ENCOUNTER — HEALTH MAINTENANCE LETTER (OUTPATIENT)
Age: 63
End: 2023-10-29
